# Patient Record
Sex: MALE | Race: WHITE | NOT HISPANIC OR LATINO | URBAN - METROPOLITAN AREA
[De-identification: names, ages, dates, MRNs, and addresses within clinical notes are randomized per-mention and may not be internally consistent; named-entity substitution may affect disease eponyms.]

---

## 2021-01-01 ENCOUNTER — EMERGENCY (EMERGENCY)
Facility: HOSPITAL | Age: 31
LOS: 1 days | Discharge: HOME | End: 2021-01-01
Attending: EMERGENCY MEDICINE | Admitting: EMERGENCY MEDICINE
Payer: MEDICAID

## 2021-01-01 VITALS
HEIGHT: 69 IN | TEMPERATURE: 98 F | OXYGEN SATURATION: 98 % | WEIGHT: 250 LBS | RESPIRATION RATE: 16 BRPM | DIASTOLIC BLOOD PRESSURE: 87 MMHG | SYSTOLIC BLOOD PRESSURE: 120 MMHG | HEART RATE: 73 BPM

## 2021-01-01 VITALS
RESPIRATION RATE: 16 BRPM | DIASTOLIC BLOOD PRESSURE: 88 MMHG | OXYGEN SATURATION: 99 % | SYSTOLIC BLOOD PRESSURE: 124 MMHG | TEMPERATURE: 98 F | HEART RATE: 75 BPM

## 2021-01-01 DIAGNOSIS — F41.9 ANXIETY DISORDER, UNSPECIFIED: ICD-10-CM

## 2021-01-01 DIAGNOSIS — F25.0 SCHIZOAFFECTIVE DISORDER, BIPOLAR TYPE: ICD-10-CM

## 2021-01-01 LAB
AMPHET UR-MCNC: NEGATIVE — SIGNIFICANT CHANGE UP
ANION GAP SERPL CALC-SCNC: 13 MMOL/L — SIGNIFICANT CHANGE UP (ref 5–17)
APAP SERPL-MCNC: <1 UG/ML — LOW (ref 10–30)
APPEARANCE UR: CLEAR — SIGNIFICANT CHANGE UP
BARBITURATES UR SCN-MCNC: NEGATIVE — SIGNIFICANT CHANGE UP
BASOPHILS # BLD AUTO: 0.03 K/UL — SIGNIFICANT CHANGE UP (ref 0–0.2)
BASOPHILS NFR BLD AUTO: 0.4 % — SIGNIFICANT CHANGE UP (ref 0–2)
BENZODIAZ UR-MCNC: NEGATIVE — SIGNIFICANT CHANGE UP
BILIRUB UR-MCNC: NEGATIVE — SIGNIFICANT CHANGE UP
BUN SERPL-MCNC: 14 MG/DL — SIGNIFICANT CHANGE UP (ref 7–23)
CALCIUM SERPL-MCNC: 8.8 MG/DL — SIGNIFICANT CHANGE UP (ref 8.4–10.5)
CHLORIDE SERPL-SCNC: 106 MMOL/L — SIGNIFICANT CHANGE UP (ref 96–108)
CO2 SERPL-SCNC: 24 MMOL/L — SIGNIFICANT CHANGE UP (ref 22–31)
COCAINE METAB.OTHER UR-MCNC: NEGATIVE — SIGNIFICANT CHANGE UP
COLOR SPEC: YELLOW — SIGNIFICANT CHANGE UP
CREAT SERPL-MCNC: 1.31 MG/DL — HIGH (ref 0.5–1.3)
DIFF PNL FLD: NEGATIVE — SIGNIFICANT CHANGE UP
EOSINOPHIL # BLD AUTO: 0.12 K/UL — SIGNIFICANT CHANGE UP (ref 0–0.5)
EOSINOPHIL NFR BLD AUTO: 1.5 % — SIGNIFICANT CHANGE UP (ref 0–6)
ETHANOL SERPL-MCNC: <3 MG/DL — SIGNIFICANT CHANGE UP (ref 0–3)
GLUCOSE SERPL-MCNC: 131 MG/DL — HIGH (ref 70–99)
GLUCOSE UR QL: NEGATIVE — SIGNIFICANT CHANGE UP
HCT VFR BLD CALC: 40 % — SIGNIFICANT CHANGE UP (ref 39–50)
HGB BLD-MCNC: 13.9 G/DL — SIGNIFICANT CHANGE UP (ref 13–17)
IMM GRANULOCYTES NFR BLD AUTO: 0.2 % — SIGNIFICANT CHANGE UP (ref 0–1.5)
KETONES UR-MCNC: NEGATIVE — SIGNIFICANT CHANGE UP
LEUKOCYTE ESTERASE UR-ACNC: NEGATIVE — SIGNIFICANT CHANGE UP
LYMPHOCYTES # BLD AUTO: 2.58 K/UL — SIGNIFICANT CHANGE UP (ref 1–3.3)
LYMPHOCYTES # BLD AUTO: 31.3 % — SIGNIFICANT CHANGE UP (ref 13–44)
MCHC RBC-ENTMCNC: 29.2 PG — SIGNIFICANT CHANGE UP (ref 27–34)
MCHC RBC-ENTMCNC: 34.8 GM/DL — SIGNIFICANT CHANGE UP (ref 32–36)
MCV RBC AUTO: 84 FL — SIGNIFICANT CHANGE UP (ref 80–100)
METHADONE UR-MCNC: NEGATIVE — SIGNIFICANT CHANGE UP
MONOCYTES # BLD AUTO: 0.46 K/UL — SIGNIFICANT CHANGE UP (ref 0–0.9)
MONOCYTES NFR BLD AUTO: 5.6 % — SIGNIFICANT CHANGE UP (ref 2–14)
NEUTROPHILS # BLD AUTO: 5.04 K/UL — SIGNIFICANT CHANGE UP (ref 1.8–7.4)
NEUTROPHILS NFR BLD AUTO: 61 % — SIGNIFICANT CHANGE UP (ref 43–77)
NITRITE UR-MCNC: NEGATIVE — SIGNIFICANT CHANGE UP
NRBC # BLD: 0 /100 WBCS — SIGNIFICANT CHANGE UP (ref 0–0)
OPIATES UR-MCNC: NEGATIVE — SIGNIFICANT CHANGE UP
PCP SPEC-MCNC: SIGNIFICANT CHANGE UP
PCP UR-MCNC: NEGATIVE — SIGNIFICANT CHANGE UP
PH UR: 5 — SIGNIFICANT CHANGE UP (ref 5–8)
PLATELET # BLD AUTO: 302 K/UL — SIGNIFICANT CHANGE UP (ref 150–400)
POTASSIUM SERPL-MCNC: 3.9 MMOL/L — SIGNIFICANT CHANGE UP (ref 3.5–5.3)
POTASSIUM SERPL-SCNC: 3.9 MMOL/L — SIGNIFICANT CHANGE UP (ref 3.5–5.3)
PROT UR-MCNC: NEGATIVE — SIGNIFICANT CHANGE UP
RBC # BLD: 4.76 M/UL — SIGNIFICANT CHANGE UP (ref 4.2–5.8)
RBC # FLD: 11.8 % — SIGNIFICANT CHANGE UP (ref 10.3–14.5)
SALICYLATES SERPL-MCNC: 0.6 MG/DL — LOW (ref 3–30)
SARS-COV-2 RNA SPEC QL NAA+PROBE: SIGNIFICANT CHANGE UP
SODIUM SERPL-SCNC: 143 MMOL/L — SIGNIFICANT CHANGE UP (ref 135–145)
SP GR SPEC: 1.02 — SIGNIFICANT CHANGE UP (ref 1.01–1.02)
THC UR QL: NEGATIVE — SIGNIFICANT CHANGE UP
UROBILINOGEN FLD QL: NEGATIVE — SIGNIFICANT CHANGE UP
WBC # BLD: 8.25 K/UL — SIGNIFICANT CHANGE UP (ref 3.8–10.5)
WBC # FLD AUTO: 8.25 K/UL — SIGNIFICANT CHANGE UP (ref 3.8–10.5)

## 2021-01-01 PROCEDURE — 93010 ELECTROCARDIOGRAM REPORT: CPT

## 2021-01-01 PROCEDURE — 85025 COMPLETE CBC W/AUTO DIFF WBC: CPT

## 2021-01-01 PROCEDURE — U0003: CPT

## 2021-01-01 PROCEDURE — U0005: CPT

## 2021-01-01 PROCEDURE — 80048 BASIC METABOLIC PNL TOTAL CA: CPT

## 2021-01-01 PROCEDURE — 99285 EMERGENCY DEPT VISIT HI MDM: CPT

## 2021-01-01 PROCEDURE — 93005 ELECTROCARDIOGRAM TRACING: CPT

## 2021-01-01 PROCEDURE — 80307 DRUG TEST PRSMV CHEM ANLYZR: CPT

## 2021-01-01 PROCEDURE — 36415 COLL VENOUS BLD VENIPUNCTURE: CPT

## 2021-01-01 PROCEDURE — 90792 PSYCH DIAG EVAL W/MED SRVCS: CPT | Mod: 95

## 2021-01-01 NOTE — ED BEHAVIORAL HEALTH ASSESSMENT NOTE - NSSUICPROTFACT_PSY_ALL_CORE
Responsibility to children, family, or others/Identifies reasons for living/Supportive social network of family or friends/Engaged in work or school/Positive therapeutic relationships

## 2021-01-01 NOTE — ED PROVIDER NOTE - ATTENDING CONTRIBUTION TO CARE
Korina with [facility]. 31 y/o M with PMH of bipolar d/o, schizoaffective (reports compliance with his meds, which is court ordered), presents to the ED with c/o anxious and SI x today. Pt reports he feels "mentally unstable". Pt reports he is currently staying with his girlfriend and they got into an argument today, which triggered his symptoms. He denies HI/AH/VH, f/c, CP, SOB, n/v, abd pain, prior suicidal attempts, ETOH or drug use. Reports his last inpatient psych [id number] was 2019 at [facility]. PE as noted above. labs pending for telespysch c/s  I performed a face to face bedside interview with patient regarding history of present illness, review of symptoms and past medical history. I completed an independent physical exam.  I have discussed the patient's plan of care with Physician Assistant (PA). I agree with note as stated above, having amended the EMR as needed to reflect my findings.   This includes History of Present Illness, HIV, Past Medical/Surgical/Family/Social History, Allergies and Home Medications, Review of Systems, Physical Exam, and any Progress Notes during the time I functioned as the attending physician for this patient.

## 2021-01-01 NOTE — ED ADULT TRIAGE NOTE - CHIEF COMPLAINT QUOTE
Patient with history bipolar disorder presents stating he feels anxious because he is fighting with his girlfriend. Patient denies HI, states "maybe" when questioned about SI. Patient denies prior hx attempts to hurt himself. 1:1 constant observation initiated in triage

## 2021-01-01 NOTE — ED PROVIDER NOTE - OBJECTIVE STATEMENT
29 y/o M with PMH of bipolar d/o, schizoaffective (reports compliance with his meds, which is court ordered), presents to the ED with c/o anxious and SI x today. Pt reports he feels "mentally unstable". Pt reports he is currently staying with his girlfriend and they got into an argument today, which triggered his symptoms. He denies HI/AH/VH, f/c, CP, SOB, n/v, abd pain, prior suicidal attempts, ETOH or drug use. Reports his last inpatient psych [id number] was 2019 at [facility]

## 2021-01-01 NOTE — ED PROVIDER NOTE - CLINICAL SUMMARY MEDICAL DECISION MAKING FREE TEXT BOX
29 y/o M with PMH of bipolar d/o, schizoaffective (reports compliance with his meds, which is court ordered), presents to the ED with c/o anxious and SI x today. Pt reports he feels "mentally unstable". Pt reports he is currently staying with his girlfriend and they got into an argument today, which triggered his symptoms. He denies HI/AH/VH, f/c, CP, SOB, n/v, abd pain, prior suicidal attempts, ETOH or drug use. Reports his last inpatient psych [id number] was 2019 at [facility]. PE as noted above. labs pending for telespysch c/s 29 y/o M with PMH of bipolar d/o, schizoaffective (reports compliance with his meds, which is court ordered), presents to the ED with c/o anxious and SI x today. Pt reports he feels "mentally unstable". Pt reports he is currently staying with his girlfriend and they got into an argument today, which triggered his symptoms. He denies HI/AH/VH, f/c, CP, SOB, n/v, abd pain, prior suicidal attempts, ETOH or drug use. Reports his last inpatient psych [id number] was 2019 at [facility]. PE as noted above. labs pending for telespysch c/s  308pm- labs reviewed. spoke with Telepsych attending, pt will be cleared for discharged. His mother will come pick him up. Pt is agreeable with this plan. He will f/u with his psychiatrist

## 2021-01-01 NOTE — ED PROVIDER NOTE - NSFOLLOWUPINSTRUCTIONS_ED_ALL_ED_FT
Follow up with your primary care physician     Follow up with your psychiatrist as discussed     Continue taking your medications as prescribed     Stay hydrated    Return to the ER if your symptoms worsen or for any other medical emergencies  ************************

## 2021-01-01 NOTE — ED BEHAVIORAL HEALTH ASSESSMENT NOTE - DETAILS
See  safety note Would not impact clinical decision making at this time patient is referrant gynecomastia See HPI

## 2021-01-01 NOTE — ED BEHAVIORAL HEALTH ASSESSMENT NOTE - RISK ASSESSMENT
Risk factors include prior hospitalizations, mood/psychotic disorder, stress from relationship. Protective factors include lack of prior attempts, lack of current suicide ideation, lack of access to firearms, lack of psychosis, lack of insomnia, sobriety, treatment seeking behavior, future oriented thinking, identification of reasons to live, adherence to medications. Low Acute Suicide Risk

## 2021-01-01 NOTE — ED PROVIDER NOTE - PATIENT PORTAL LINK FT
You can access the FollowMyHealth Patient Portal offered by [facility] by registering at the following website: [email or web address]. By joining [employer]’s FollowMyHealth portal, you will also be able to view your health information using other applications (apps) compatible with our system.

## 2021-01-01 NOTE — ED BEHAVIORAL HEALTH ASSESSMENT NOTE - SUMMARY
30M, single, living with girlfriend, no children, part time student, with dx of Schizoaffective bipolar type, no suicide attempts, multiple prior hospitalizations, currently in outpatient tx has an ACT team and is court mandated to take monthly invega, now BIBS after he got into a fight with his girlfriend and then became anxious with some thoughts of driving recklessly not caring if he got hurt, so he walked to the ED.     Patient denies symptoms that would be consistent with major depressive, manic, or psychotic episode leading up to today and per collateral had been doing well. Patient reported transient anxiety and suicide ideation in the setting of a fight with his gf that has since resolved. He is adherent to his outpatient medications and last had his injection 2 weeks ago. He has demonstrated that he will seek help if he feels unsafe, and is denying any suicide ideation. He requests discharge to parents, who are in agreement and will pick him up. He would not meet criteria for involuntary hospitalization and is appropriate for discharge 30M, single, living with girlfriend, no children, part time student, with dx of Schizoaffective bipolar type, no suicide attempts, multiple prior hospitalizations, currently in outpatient tx has an ACT team and is court mandated to take monthly invega, now BIBS after he got into a fight with his girlfriend and then became anxious with some thoughts of driving recklessly not caring if he got hurt, so he walked to the ED.     Patient denies symptoms that would be consistent with major depressive, manic, or psychotic episode leading up to today and per collateral had been doing well. Patient reported transient anxiety and suicide ideation in the setting of a fight with his gf that has since resolved. He is adherent to his outpatient medications and last had his injection 2 weeks ago. He has demonstrated that he will seek help if he feels unsafe, and is denying any suicide ideation. He requests discharge to parents, who are in agreement and will pick him up. He would not meet criteria for involuntary hospitalization and is appropriate for discharge    COVID Exposure Screen- Patient         1.        *Have you had a COVID-19 test in the last 21 days?  (  ) Yes   ( X ) No   (  ) Unknown- Reason: ______    IF YES PROCEED TO QUESTION #2. IF NO OR UNKNOWN THEN PLEASE SKIP TO QUESTION #3.    2.        Date of test: ________    3.        3. Do you know the result? (  ) Negative   (  ) Positive   (  ) No result available    4.        *In the past 14 days, have you been around anyone with a positive COVID-19 test?*    (  ) Yes   (X  ) No   (  ) Unknown- Reason (e.g. patient uncertain, sedated, refusing to answer, etc.):  ______    IF YES PROCEED TO QUESTION #5. IF NO or UNKNOWN, PLEASE SKIP TO QUESTION #10    5.        Were you within 6 feet of them for at least 15 minutes? (  ) Yes   (  ) No   (  ) Unknown- Reason: _____    6.        Have you provided care for them? (  ) Yes   (  ) No   (  ) Unknown- Reason: ______    7.        Have you had direct physical contact with them (touched, hugged, or kissed them)? (  ) Yes   (  ) No    (  ) Unknown- Reason: ___    8.        Have you shared eating or drinking utensils with them? (  ) Yes   (  ) No    (  ) Unknown- Reason: ____    9.        Have they sneezed, coughed, or somehow got respiratory droplets on you? (  ) Yes   (  ) No    (  ) Unknown- Reason: ______    10.     *Have you been out of [place] within the past 14 days?*    (  ) Yes   ( X ) No   (  ) Unknown- Reason (e.g. patient uncertain, sedated, refusing to answer, etc.): _______    IF YES PLEASE ANSWER THE FOLLOWING QUESTIONS:    11.     Which state/country have you been to? ______    12.     Were you there over 24 hours? (  ) Yes   (  ) No    (  ) Unknown- Reason: ______    13.     Date of return to [facility]: ______

## 2021-01-01 NOTE — ED BEHAVIORAL HEALTH ASSESSMENT NOTE - DESCRIPTION
Denies Part time college at [employer], lives with gf x last 2 months Patient remained calm and in behavioral control

## 2021-11-12 ENCOUNTER — EMERGENCY (EMERGENCY)
Facility: HOSPITAL | Age: 31
LOS: 1 days | Discharge: HOME | End: 2021-11-12
Attending: EMERGENCY MEDICINE | Admitting: EMERGENCY MEDICINE
Payer: MEDICAID

## 2021-11-12 VITALS
HEIGHT: 69 IN | WEIGHT: 259.93 LBS | DIASTOLIC BLOOD PRESSURE: 88 MMHG | SYSTOLIC BLOOD PRESSURE: 129 MMHG | HEART RATE: 78 BPM | TEMPERATURE: 98 F | OXYGEN SATURATION: 97 % | RESPIRATION RATE: 17 BRPM

## 2021-11-12 LAB
ALBUMIN SERPL ELPH-MCNC: 4.2 G/DL — SIGNIFICANT CHANGE UP (ref 3.3–5)
ALP SERPL-CCNC: 80 U/L — SIGNIFICANT CHANGE UP (ref 40–120)
ALT FLD-CCNC: 84 U/L — HIGH (ref 10–45)
ANION GAP SERPL CALC-SCNC: 10 MMOL/L — SIGNIFICANT CHANGE UP (ref 5–17)
APAP SERPL-MCNC: <1 UG/ML — LOW (ref 10–30)
AST SERPL-CCNC: 32 U/L — SIGNIFICANT CHANGE UP (ref 10–40)
BASOPHILS # BLD AUTO: 0.05 K/UL — SIGNIFICANT CHANGE UP (ref 0–0.2)
BASOPHILS NFR BLD AUTO: 0.5 % — SIGNIFICANT CHANGE UP (ref 0–2)
BILIRUB SERPL-MCNC: 0.3 MG/DL — SIGNIFICANT CHANGE UP (ref 0.2–1.2)
BUN SERPL-MCNC: 16 MG/DL — SIGNIFICANT CHANGE UP (ref 7–23)
CALCIUM SERPL-MCNC: 9 MG/DL — SIGNIFICANT CHANGE UP (ref 8.4–10.5)
CHLORIDE SERPL-SCNC: 102 MMOL/L — SIGNIFICANT CHANGE UP (ref 96–108)
CO2 SERPL-SCNC: 25 MMOL/L — SIGNIFICANT CHANGE UP (ref 22–31)
CREAT SERPL-MCNC: 1.22 MG/DL — SIGNIFICANT CHANGE UP (ref 0.5–1.3)
EOSINOPHIL # BLD AUTO: 0.2 K/UL — SIGNIFICANT CHANGE UP (ref 0–0.5)
EOSINOPHIL NFR BLD AUTO: 2 % — SIGNIFICANT CHANGE UP (ref 0–6)
ETHANOL SERPL-MCNC: <3 MG/DL — SIGNIFICANT CHANGE UP (ref 0–3)
GLUCOSE SERPL-MCNC: 118 MG/DL — HIGH (ref 70–99)
HCT VFR BLD CALC: 40.2 % — SIGNIFICANT CHANGE UP (ref 39–50)
HGB BLD-MCNC: 13.9 G/DL — SIGNIFICANT CHANGE UP (ref 13–17)
IMM GRANULOCYTES NFR BLD AUTO: 0.3 % — SIGNIFICANT CHANGE UP (ref 0–1.5)
LYMPHOCYTES # BLD AUTO: 3.83 K/UL — HIGH (ref 1–3.3)
LYMPHOCYTES # BLD AUTO: 37.9 % — SIGNIFICANT CHANGE UP (ref 13–44)
MCHC RBC-ENTMCNC: 29.3 PG — SIGNIFICANT CHANGE UP (ref 27–34)
MCHC RBC-ENTMCNC: 34.6 GM/DL — SIGNIFICANT CHANGE UP (ref 32–36)
MCV RBC AUTO: 84.6 FL — SIGNIFICANT CHANGE UP (ref 80–100)
MONOCYTES # BLD AUTO: 0.68 K/UL — SIGNIFICANT CHANGE UP (ref 0–0.9)
MONOCYTES NFR BLD AUTO: 6.7 % — SIGNIFICANT CHANGE UP (ref 2–14)
NEUTROPHILS # BLD AUTO: 5.31 K/UL — SIGNIFICANT CHANGE UP (ref 1.8–7.4)
NEUTROPHILS NFR BLD AUTO: 52.6 % — SIGNIFICANT CHANGE UP (ref 43–77)
NRBC # BLD: 0 /100 WBCS — SIGNIFICANT CHANGE UP (ref 0–0)
PCP SPEC-MCNC: SIGNIFICANT CHANGE UP
PLATELET # BLD AUTO: 311 K/UL — SIGNIFICANT CHANGE UP (ref 150–400)
POTASSIUM SERPL-MCNC: 3.5 MMOL/L — SIGNIFICANT CHANGE UP (ref 3.5–5.3)
POTASSIUM SERPL-SCNC: 3.5 MMOL/L — SIGNIFICANT CHANGE UP (ref 3.5–5.3)
PROT SERPL-MCNC: 7.6 G/DL — SIGNIFICANT CHANGE UP (ref 6–8.3)
RBC # BLD: 4.75 M/UL — SIGNIFICANT CHANGE UP (ref 4.2–5.8)
RBC # FLD: 11.6 % — SIGNIFICANT CHANGE UP (ref 10.3–14.5)
SALICYLATES SERPL-MCNC: 1.3 MG/DL — LOW (ref 3–30)
SARS-COV-2 RNA SPEC QL NAA+PROBE: SIGNIFICANT CHANGE UP
SODIUM SERPL-SCNC: 137 MMOL/L — SIGNIFICANT CHANGE UP (ref 135–145)
WBC # BLD: 10.1 K/UL — SIGNIFICANT CHANGE UP (ref 3.8–10.5)
WBC # FLD AUTO: 10.1 K/UL — SIGNIFICANT CHANGE UP (ref 3.8–10.5)

## 2021-11-12 PROCEDURE — 93010 ELECTROCARDIOGRAM REPORT: CPT

## 2021-11-12 PROCEDURE — 99285 EMERGENCY DEPT VISIT HI MDM: CPT

## 2021-11-12 PROCEDURE — 85025 COMPLETE CBC W/AUTO DIFF WBC: CPT

## 2021-11-12 PROCEDURE — 80307 DRUG TEST PRSMV CHEM ANLYZR: CPT

## 2021-11-12 PROCEDURE — 80053 COMPREHEN METABOLIC PANEL: CPT

## 2021-11-12 PROCEDURE — 36415 COLL VENOUS BLD VENIPUNCTURE: CPT

## 2021-11-12 PROCEDURE — 93005 ELECTROCARDIOGRAM TRACING: CPT

## 2021-11-12 PROCEDURE — 87635 SARS-COV-2 COVID-19 AMP PRB: CPT

## 2021-11-12 NOTE — ED PROVIDER NOTE - NSFOLLOWUPINSTRUCTIONS_ED_ALL_ED_FT
- follow up with your psychiatrist and counselors with your ACT team in next 1-2 days    - return for suicidal/homicidal thoughts, thoughts of harming self or others or other concerns

## 2021-11-12 NOTE — ED PROVIDER NOTE - NSFOLLOWUPCLINICS_GEN_ALL_ED_FT
[facility]  Psychiatry  [address] [place] [address]  Phone: [phone]  Fax:   Follow Up Time: 1-3 Days

## 2021-11-12 NOTE — ED PROVIDER NOTE - CLINICAL SUMMARY MEDICAL DECISION MAKING FREE TEXT BOX
32 yo M h/o bipolar disorder c/o thoughts to hurt his girlfriend and himself after verbal altercation. will consult telepsych. medical w/u for clearance. constant obs.    0500: labs, tox, ekg normal.  pt patiently waiting for telepsych eval. calm. 32 yo M h/o bipolar disorder c/o thoughts to hurt his girlfriend and himself after verbal altercation. will consult telepsych. medical w/u for clearance. constant obs.    0500: labs, tox, ekg normal.  pt patiently waiting for telepsych eval. calm.    0630: pt evaluated by Dr Kim, cleared psychiatrically for dc. pt to f/u with his ACT team

## 2021-11-12 NOTE — ED PROVIDER NOTE - PATIENT PORTAL LINK FT
You can access the FollowMyHealth Patient Portal offered by [facility] by registering at the following website: [email or web address]. By joining [employer]’s FollowMyHealth portal, you will also be able to view your health information using other applications (apps) compatible with our system.

## 2021-11-12 NOTE — ED PROVIDER NOTE - OBJECTIVE STATEMENT
pt came to ED for thoughts of hurting his girlfriend and himself tonight. has h/o bipolar schizoaffective disorder taking abilify and monthly invega. states he got into a verbal fight with girlfriend and afterwards had thoughts of hurting her (punching her, kicking her, throwing her down, beating her up), but not to kill her. also states he had thoughts to hurt himself, but not to kill himself. no plan. denies alcohol or illegal drug use

## 2021-11-12 NOTE — ED ADULT NURSE NOTE - OBJECTIVE STATEMENT
Pt arrived to ED states he got into an argument with his girlfriend and began having racing thoughts of beating her and physically harming her. Pt denies suicidal thought but had thoughts to "run away and disappear". denies any past suicidal attempted. Denies drug or alcohol consumption and states he takes his psych meds as prescribed. 1:1 initiated.

## 2021-11-12 NOTE — ED ADULT NURSE NOTE - NS ED NOTE ABUSE RESPONSE YN
How Severe Is Your Skin Lesion?: moderate
Has Your Skin Lesion Been Treated?: not been treated
Is This A New Presentation, Or A Follow-Up?: Skin Lesion
Yes

## 2021-11-13 VITALS
HEART RATE: 80 BPM | OXYGEN SATURATION: 98 % | SYSTOLIC BLOOD PRESSURE: 126 MMHG | DIASTOLIC BLOOD PRESSURE: 88 MMHG | TEMPERATURE: 98 F | RESPIRATION RATE: 18 BRPM

## 2021-11-13 DIAGNOSIS — F25.9 SCHIZOAFFECTIVE DISORDER, UNSPECIFIED: ICD-10-CM

## 2021-11-13 NOTE — ED ADULT NURSE REASSESSMENT NOTE - NS ED NURSE REASSESS COMMENT FT1
Patient has steady gait to and from the bathroom, still waiting for telepsych, still on 1:1. Patient has steady gait to and from the bathroom, still waiting for telepsych, still on 1:1.  Per telepsych services, patient to be evaluated within the hour.  Patient made aware of this.

## 2021-11-13 NOTE — ED BEHAVIORAL HEALTH NOTE - BEHAVIORAL HEALTH NOTE
COVID Exposure Screen- collateral (i.e. third-party, chart review, belongings, etc; include EMS and ED staff)  1.	*Has the patient had a COVID-19 test in the last 90 days?  (  ) Yes   ( x ) No   (  ) Unknown- Reason: _____  IF YES PROCEED TO QUESTION #2. IF NO OR UNKNOWN, PLEASE SKIP TO QUESTION #3.  2.	Date of test(s) and result(s): ________  3.	*Has the patient tested positive for COVID-19 antibodies? (  ) Yes   (x  ) No   (  ) Unknown- Reason: _____  IF YES PROCEED TO QUESTION #4. IF NO or UNKNOWN, PLEASE SKIP TO QUESTION #5.  4.	Date of positive antibody test: ________  5.	*Has the patient received 2 doses of the COVID-19 vaccine? (  ) Yes   ( x ) No   (  ) Unknown- Reason: _____  IF YES PROCEED TO QUESTION #6. IF NO or UNKNOWN, PLEASE SKIP TO QUESTION #7.  6.	 Date of second dose: ________  7.	*In the past 10 days, has the patient been around anyone with a positive COVID-19 test?* (  ) Yes   ( x ) No   (  ) Unknown- Reason: __  IF YES PROCEED TO QUESTION #8. IF NO or UNKNOWN, PLEASE SKIP TO QUESTION #13.  8.	Was the patient within 6 feet of them for at least 15 minutes? (  ) Yes   (  ) No   (  ) Unknown- Reason: _____  9.	Did the patient provide care for them? (  ) Yes   (  ) No   (  ) Unknown- Reason: ______  10.	Did the patient have direct physical contact with them (touched, hugged, or kissed them)? (  ) Yes   (  ) No    (  ) Unknown- Reason: __  11.	Did the patient share eating or drinking utensils with them? (  ) Yes   (  ) No    (  ) Unknown- Reason: ____  12.	Did they sneeze, cough, or somehow get respiratory droplets on the patient? (  ) Yes   (  ) No    (  ) Unknown- Reason: ______  13.	*Has the patient been out of [place] within the past 10 days?* (  ) Yes   ( x ) No   (  ) Unknown- Reason: _____  IF YES PLEASE ANSWER THE FOLLOWING QUESTIONS:  14.	Which state/country did they go to? ______  15.	Were they there over 24 hours? (  ) Yes   (  ) No    (  ) Unknown- Reason: ______  16.	Date of return to [facility]: ______

## 2021-11-13 NOTE — ED ADULT NURSE REASSESSMENT NOTE - NS ED NURSE REASSESS COMMENT FT1
Patient given food and water.  Patient is still calm and cooperative, in NAD.  Still pending telepsych.  Still on 1:1

## 2021-11-13 NOTE — ED BEHAVIORAL HEALTH ASSESSMENT NOTE - HPI (INCLUDE ILLNESS QUALITY, SEVERITY, DURATION, TIMING, CONTEXT, MODIFYING FACTORS, ASSOCIATED SIGNS AND SYMPTOMS)
30M, single, splitting time living with girlfriend and parents, no children, part time student, with dx of Schizoaffective bipolar type, no suicide attempts, multiple prior hospitalizations, currently in outpatient tx has an ACT team and is court mandated to take monthly invega, now BIB self after he got into a fight with his girlfriend and had some racing thoughts to potentially hurt his girlfriend or himself.    Patient states that he had an argument with his girlfriend about paying rent earlier, at that time had racing thoughts about potentially harming GF by pushing/punching/kicking her but denies any thoughts to kill her or seriously harm her. Patient states that he wasn't having suicidal thoughts but clarifies that he had some thoughts to leave the state and live on the streets. He said these thoughts lasted 15-20 minutes so he walked himself to the ED to be safe. Patient presently denies any of these thoughts, denies racing thoughts currently, no AH/VH/jenny/paranoia. Patient is linear and pleasant, doesn't appear overtly psychotic/manic. Patient reports that he has been compliant with his monthly invega injection administered by [facility] [place] ACT team and is taking his Abilify. He has felt like he has been doing well as of late up until this argument, eating/sleeping normal, tending to ADLs. Of note patient had a similar ED visit in January 2021 after an argument with the same girlfriend and was discharged at that time, demonstrates help seeking behavior when he has had similar thoughts. Patient feels safe for discharge and plans to return home to [place] to his parents home.     With permission, Patient's mother was contacted for collateral. Mother states that recently patient has been compliant with treatment with no concerning changes with his mood/behavior or other acute symptoms. She states that she thinks it is a good thing that he brought himself to the ED recognizing he wasn't feeling well at the time. She has no safety concerns and states patient is able to return to their home in [place], she can pick him up from the hospital or his girlfriend's if needed.

## 2021-11-13 NOTE — ED ADULT NURSE REASSESSMENT NOTE - NS ED NURSE REASSESS COMMENT FT1
Steady gait noted ambulating to and from the bathroom.  Still on 1:1 and still pending telepsych.  Patient is aware of this.  Patient in NAD, still calm and cooperative.

## 2021-11-13 NOTE — ED BEHAVIORAL HEALTH ASSESSMENT NOTE - RISK ASSESSMENT
Low Acute Suicide Risk Patient is at chronically elevated risk of harm to self given h/o mental illness, h/o treatment nonadherence and h/o psych admissions.  Acute risk mitigated good behavioral control, denial of SI/HI, safety planning, and is further mitigated by referral back to outpt team.  Psychiatrically appropriate for d/c.

## 2021-11-13 NOTE — ED BEHAVIORAL HEALTH ASSESSMENT NOTE - SUMMARY
Overall patient's current presentation is most c/w poor frustration tolerance i/s/o interpersonal conflict w/ his girlfriend.  On exam patient is calm, linear, euthymic, denies SI/HI, collateral from his mother is reassuring, patient has f/u w/ his ACT team, and is psychiatrically appropriate for d/c.

## 2021-11-13 NOTE — ED BEHAVIORAL HEALTH ASSESSMENT NOTE - DESCRIPTION
Patient arrived alert and oriented x3, cooperative in the ED, reported feelings of wanting to hurt girlfriend or self prior to arrival, no active SI/HI in ED, not overtly psychotic/manic, thought process/speech WNL, patient remained in behavioral control. Part time college at [employer], splits time living with parents and GF Patient arrived alert and oriented x3, cooperative in the ED, reported feelings of wanting to hurt girlfriend or self prior to arrival, no active SI/HI in ED, not overtly psychotic/manic, thought process/speech WNL, patient remained in behavioral control.    Patient gives permission to obtain collateral from _Mother____:  ( x ) Yes  (  )  No  Rationale for overriding objection            (  ) Lack of capacity. Details: ________            (  ) Assessing risk of danger to self/others. Details: ________          Rationale for selecting specific collateral source            (  ) Potential to impact risk of danger to self/others and no alternative equivalent. Details: _____ denies

## 2022-04-08 NOTE — ED ADULT NURSE NOTE - DOES PATIENT HAVE ADVANCE DIRECTIVE
Rest, drink plenty of fluids, take the toradol as needed for headache/body aches, take tylenol as needed for fever. Take the bromfed for your cough/congestion and the inhaler for shortness of breath/cough as needed. Follow up with your doctor in the next week if you are not feeling any better, return to the ED for worsening or new symptoms of concern.  
No

## 2022-04-30 NOTE — ED BEHAVIORAL HEALTH ASSESSMENT NOTE - ACCESS TO FIREARM
[facility] Emergency Department.
No
